# Patient Record
Sex: FEMALE | Race: BLACK OR AFRICAN AMERICAN | NOT HISPANIC OR LATINO | URBAN - METROPOLITAN AREA
[De-identification: names, ages, dates, MRNs, and addresses within clinical notes are randomized per-mention and may not be internally consistent; named-entity substitution may affect disease eponyms.]

---

## 2022-10-19 ENCOUNTER — OFFICE VISIT (OUTPATIENT)
Dept: URBAN - METROPOLITAN AREA CLINIC 113 | Facility: CLINIC | Age: 22
End: 2022-10-19
Payer: COMMERCIAL

## 2022-10-19 ENCOUNTER — DASHBOARD ENCOUNTERS (OUTPATIENT)
Age: 22
End: 2022-10-19

## 2022-10-19 ENCOUNTER — WEB ENCOUNTER (OUTPATIENT)
Dept: URBAN - METROPOLITAN AREA CLINIC 113 | Facility: CLINIC | Age: 22
End: 2022-10-19

## 2022-10-19 VITALS
SYSTOLIC BLOOD PRESSURE: 116 MMHG | BODY MASS INDEX: 19.31 KG/M2 | RESPIRATION RATE: 20 BRPM | TEMPERATURE: 98.4 F | HEART RATE: 85 BPM | WEIGHT: 109 LBS | DIASTOLIC BLOOD PRESSURE: 69 MMHG | HEIGHT: 63 IN

## 2022-10-19 DIAGNOSIS — R14.0 BLOATING: ICD-10-CM

## 2022-10-19 DIAGNOSIS — Z86.19 HISTORY OF HEPATITIS A: ICD-10-CM

## 2022-10-19 DIAGNOSIS — R09.89 GLOBUS SENSATION: ICD-10-CM

## 2022-10-19 DIAGNOSIS — R19.6 HALITOSIS: ICD-10-CM

## 2022-10-19 PROCEDURE — 99203 OFFICE O/P NEW LOW 30 MIN: CPT

## 2022-10-21 ENCOUNTER — TELEPHONE ENCOUNTER (OUTPATIENT)
Dept: URBAN - METROPOLITAN AREA CLINIC 113 | Facility: CLINIC | Age: 22
End: 2022-10-21

## 2022-10-21 LAB
H PYLORI BREATH TEST: NOT DETECTED
H. PYLORI BREATH TEST: NOT DETECTED
INTERPRETATION: NOT DETECTED

## 2022-10-21 RX ORDER — OMEPRAZOLE 20 MG/1
1 TABLET 30 MINUTES BEFORE MORNING MEAL TABLET, DELAYED RELEASE ORAL ONCE A DAY
Qty: 30 | Refills: 4 | OUTPATIENT
Start: 2022-10-21

## 2022-11-30 ENCOUNTER — OFFICE VISIT (OUTPATIENT)
Dept: URBAN - METROPOLITAN AREA CLINIC 113 | Facility: CLINIC | Age: 22
End: 2022-11-30

## 2022-11-30 PROBLEM — 79879001: Status: ACTIVE | Noted: 2022-10-19

## 2022-11-30 RX ORDER — OMEPRAZOLE 20 MG/1
1 TABLET 30 MINUTES BEFORE MORNING MEAL TABLET, DELAYED RELEASE ORAL ONCE A DAY
Qty: 30 | Refills: 4 | Status: ACTIVE | COMMUNITY
Start: 2022-10-21

## 2022-11-30 NOTE — HPI-TODAY'S VISIT:
Ms. Lynch is a 22-year-old female presenting for follow up.   Last seen 10/19/22 to discuss recent lab results. Explained that the Hep A total being positive, but Hep A IgM negative is consistent with a past infection that has since been cleared.  She reported globus sensation and chronic halitosis with mucous and bloating. She wants to have her stomach checked to know if something from there is causing the symptoms. We talked about possibly proceeding with EGD, but she got teary and anxious at the thought of a needle for the IV. We talked about getting a urea breath test to rule out h. pylori and after emperically starting her on a low dose PPI to see if any of the symptoms are resolved. She is agreeable to this plan. H pylori breath test was negative, omeprazole 20mg QD was sent in.
